# Patient Record
Sex: FEMALE | Race: BLACK OR AFRICAN AMERICAN | Employment: UNEMPLOYED | URBAN - METROPOLITAN AREA
[De-identification: names, ages, dates, MRNs, and addresses within clinical notes are randomized per-mention and may not be internally consistent; named-entity substitution may affect disease eponyms.]

---

## 2021-08-07 NOTE — BH NOTES
TRANSFER - IN REPORT:    Verbal report received from RN(name) on Noah Torres  being received from [facility] ACUTE PSYCH UN[facility]) for routine progression of care      Report consisted of patients Situation, Background, Assessment and   Recommendations(SBAR). Information from the following report(s) SBAR was reviewed with the receiving nurse. Opportunity for questions and clarification was provided. Assessment completed upon patients arrival to unit and care assumed.

## 2021-08-08 ENCOUNTER — HOSPITAL ENCOUNTER (INPATIENT)
Age: 36
LOS: 2 days | Discharge: HOME | DRG: 885 | End: 2021-08-10
Attending: PSYCHIATRY & NEUROLOGY | Admitting: PSYCHIATRY & NEUROLOGY
Payer: MEDICARE

## 2021-08-08 PROCEDURE — 65220000003 HC RM SEMIPRIVATE PSYCH

## 2021-08-08 PROCEDURE — 74011250637 HC RX REV CODE- 250/637: Performed by: NURSE PRACTITIONER

## 2021-08-08 RX ORDER — DIPHENHYDRAMINE HYDROCHLORIDE 50 MG/ML
50 INJECTION, SOLUTION INTRAMUSCULAR; INTRAVENOUS
Status: DISCONTINUED | OUTPATIENT
Start: 2021-08-08 | End: 2021-08-10 | Stop reason: HOSPADM

## 2021-08-08 RX ORDER — HYDROXYZINE 50 MG/1
50 TABLET, FILM COATED ORAL
Status: DISCONTINUED | OUTPATIENT
Start: 2021-08-08 | End: 2021-08-10 | Stop reason: HOSPADM

## 2021-08-08 RX ORDER — MIRTAZAPINE 15 MG/1
15 TABLET, FILM COATED ORAL
Status: ON HOLD | COMMUNITY
End: 2021-08-09

## 2021-08-08 RX ORDER — ACETAMINOPHEN 325 MG/1
650 TABLET ORAL
Status: DISCONTINUED | OUTPATIENT
Start: 2021-08-08 | End: 2021-08-10 | Stop reason: HOSPADM

## 2021-08-08 RX ORDER — OLANZAPINE 5 MG/1
5 TABLET ORAL
Status: DISCONTINUED | OUTPATIENT
Start: 2021-08-08 | End: 2021-08-10 | Stop reason: HOSPADM

## 2021-08-08 RX ORDER — ADHESIVE BANDAGE
30 BANDAGE TOPICAL DAILY PRN
Status: DISCONTINUED | OUTPATIENT
Start: 2021-08-08 | End: 2021-08-10 | Stop reason: HOSPADM

## 2021-08-08 RX ORDER — TRAZODONE HYDROCHLORIDE 50 MG/1
50 TABLET ORAL
Status: DISCONTINUED | OUTPATIENT
Start: 2021-08-08 | End: 2021-08-10 | Stop reason: HOSPADM

## 2021-08-08 RX ORDER — SERTRALINE HYDROCHLORIDE 50 MG/1
50 TABLET, FILM COATED ORAL DAILY
Status: ON HOLD | COMMUNITY
End: 2021-08-09

## 2021-08-08 RX ORDER — BUPROPION HYDROCHLORIDE 150 MG/1
150 TABLET ORAL DAILY
Status: ON HOLD | COMMUNITY
End: 2021-08-09

## 2021-08-08 RX ORDER — HALOPERIDOL 5 MG/ML
5 INJECTION INTRAMUSCULAR
Status: DISCONTINUED | OUTPATIENT
Start: 2021-08-08 | End: 2021-08-10 | Stop reason: HOSPADM

## 2021-08-08 RX ORDER — BENZTROPINE MESYLATE 1 MG/1
1 TABLET ORAL
Status: DISCONTINUED | OUTPATIENT
Start: 2021-08-08 | End: 2021-08-10 | Stop reason: HOSPADM

## 2021-08-08 RX ORDER — IBUPROFEN 200 MG
1 TABLET ORAL DAILY
Status: DISCONTINUED | OUTPATIENT
Start: 2021-08-09 | End: 2021-08-10 | Stop reason: HOSPADM

## 2021-08-08 RX ORDER — LORAZEPAM 2 MG/ML
1 INJECTION INTRAMUSCULAR
Status: DISCONTINUED | OUTPATIENT
Start: 2021-08-08 | End: 2021-08-10 | Stop reason: HOSPADM

## 2021-08-08 RX ADMIN — HYDROXYZINE HYDROCHLORIDE 50 MG: 50 TABLET, FILM COATED ORAL at 17:11
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
## 2021-08-08 NOTE — PROGRESS NOTES
Problem: Chemical Dependency (Adult/Pediatric)  Goal: *STG: Participates in treatment plan  Outcome: Progressing Towards Goal  Goal: *STG: Remains safe in hospital  Outcome: Progressing Towards Goal  Goal: *STG: Seeks staff when symptoms of withdrawal increase  Outcome: Progressing Towards Goal    Patient compliant with directives. Oriented to unit.

## 2021-08-09 PROCEDURE — 65220000003 HC RM SEMIPRIVATE PSYCH

## 2021-08-09 PROCEDURE — 74011250637 HC RX REV CODE- 250/637: Performed by: PSYCHIATRY & NEUROLOGY

## 2021-08-09 PROCEDURE — 74011250637 HC RX REV CODE- 250/637: Performed by: NURSE PRACTITIONER

## 2021-08-09 RX ORDER — MIRTAZAPINE 15 MG/1
15 TABLET, FILM COATED ORAL
Status: DISCONTINUED | OUTPATIENT
Start: 2021-08-09 | End: 2021-08-10 | Stop reason: HOSPADM

## 2021-08-09 RX ORDER — SERTRALINE HYDROCHLORIDE 50 MG/1
50 TABLET, FILM COATED ORAL DAILY
Status: DISCONTINUED | OUTPATIENT
Start: 2021-08-10 | End: 2021-08-10 | Stop reason: HOSPADM

## 2021-08-09 RX ORDER — LAMOTRIGINE 25 MG/1
25 TABLET ORAL DAILY
Status: DISCONTINUED | OUTPATIENT
Start: 2021-08-10 | End: 2021-08-10 | Stop reason: HOSPADM

## 2021-08-09 RX ADMIN — MIRTAZAPINE 15 MG: 15 TABLET, FILM COATED ORAL at 20:50

## 2021-08-09 NOTE — PROGRESS NOTES
2300: Resting quietly in bed with eyes closed. No apparent distress. Respirations are even and unlabored. Staff will continue to monitor q15 throughout the shift. Problem: Falls - Risk of  Goal: *Absence of Falls  Description: Document Renay Barba Fall Risk and appropriate interventions in the flowsheet.   Outcome: Progressing Towards Goal  Note: Fall Risk Interventions:            Medication Interventions: Teach patient to arise slowly

## 2021-08-09 NOTE — BH NOTES
GROUP THERAPY PROGRESS NOTE    Patient is participating in community meeting/discharge and goals group. Group time: 50 minutes    Personal goal for participation: Set goals for treatment and mental health recovery as a strategy to increase motivation around action steps to achieving goals and start to identify some objectives for future individual or group therapy in the outpatient setting. Goal orientation: Personal    Group therapy participation:  passive     Therapeutic interventions reviewed and discussed: Group members were engaged in conversation about treatment team, effective communication and goal setting. Group members were given the opportunity to reflect on the most important areas of their lives (family, friends, work/school, spirituality, physical health, and mental health), what they are happy with and feel is going well, as well as what they would like to improve on and how that would change their life. They were encouraged to set goals (for the day, the week and their discharge/treatment plan) and discuss the actions steps necessary to achieve their goals. Impression of participation: She was alert, oriented and calm. She demonstrated active listening at times but also appeared agitated and disengaged with her Tx in general. She remained in group but did not participate in conversation.        TAMMY Foy, Supervisee in Social Work

## 2021-08-09 NOTE — PROGRESS NOTES
Admission Medication Reconciliation:    Information obtained from:  patient interview, communication with [address] (phone number: [phone]), and [facility]  RxQuery data available¹:  NO    Comments/Recommendations: Updated PTA meds/reviewed patient's allergies. 1)  Patient reports that she was not taking any medications prior to admission. She most recently was prescribed bupropion XL 150mg, sertraline 50mg daily, and mirtazapine 15mg QHS. RN called [address] and they confirmed that the patient had been prescribed those medications last in 2/2021. Per the [facility], she was also prescribed naltrexone 50mg daily in 2/2021. History of previous hospitalization 8/12/12-8/17/12 [facility]). Discharged on: escitalopram 20 mg QAM + risperidone 2 mg QHS + VPA  mg BID + lithium 300 mg TID. Also was prescribed lamotrigine and olanzapine in 2013. 2)  The [place] Prescription Monitoring Program () was assessed to determine fill history of any controlled medications. The patient has NOT filled any controlled medications in the last  2 years. 3)  Medication changes (since last review):  Removed  - bupropion, cyclobenzaprine, escitalopram, estradiol, lamotrigine, lithium, medroxyprogesterone, mirtazapine, olanzapine, sertraline, and trazodone. ¹RxQuery pharmacy benefit data reflects medications filled and processed through the patient's insurance, however this data does NOT capture whether the medication was picked up or is currently being taken by the patient. Allergies:  Patient has no known allergies. Significant PMH/Disease States:   Past Medical History:   Diagnosis Date    Asthma     as a child - not currently on medication. Psychosis 4/1/2013    Supervision of other normal pregnancy 5/5/2011     Chief Complaint for this Admission:  No chief complaint on file.     Prior to Admission Medications:   None     Morales Sanz

## 2021-08-09 NOTE — PROGRESS NOTES
NUTRITION     Nutrition screening referral was triggered based on results obtained during nursing admission assessment for Unsure wt loss    The patient's chart was reviewed and nutrition assessment is not indicated at this time. No significant wt changes noted. Wt Readings:   08/08/21 66.3 kg (146 lb 1.6 oz)   05/14/14 59.6 kg (131 lb 6.4 oz)   12/05/13 64.1 kg (141 lb 6.4 oz)     Plan to see patient for rescreen as indicated. Thank you.      Dann Mejias RD
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
## 2021-08-09 NOTE — PROGRESS NOTES
Problem: Chemical Dependency (Adult/Pediatric)  Goal: *STG: Participates in treatment plan  Outcome: Progressing Towards Goal     Problem: Falls - Risk of  Goal: *Absence of Falls  Description: Document Luis Fall Risk and appropriate interventions in the flowsheet. Outcome: Progressing Towards Goal  Note: Fall Risk Interventions:            Medication Interventions: Teach patient to arise slowly       Pt received calm and cooperative, no distress noted, pt meal and medication compliant, will continue to monitor patient q15 mins for safety.

## 2021-08-09 NOTE — BH NOTES
GROUP THERAPY PROGRESS NOTE     Patient did not participate in Psychotherapy Group.      TAMMY Wilde, Supervisee in Social Work

## 2021-08-09 NOTE — INTERDISCIPLINARY ROUNDS
Behavioral Health Interdisciplinary Rounds     Patient Name: Delroy Humphrey  Age: 37 y.o. Room/Bed:  729/  Primary Diagnosis: <principal problem not specified>   Admission Status: Voluntary     Readmission within 30 days: no  Power of  in place: no  Patient requires a blocked bed: no          Reason for blocked bed:     VTE Prophylaxis: No    Mobility needs/Fall risk: no  Flu Vaccine : no   Nutritional Plan: no  Consults:          Labs/Testing due today?: no    Sleep hours:  7        Participation in Care/Groups:  New Admission  Medication Compliant?:   PRNS (last 24 hours): Antianxiety    Restraints (last 24 hours):  no     CIWA (range last 24 hours):     COWS (range last 24 hours):      Alcohol screening (AUDIT) completed -   AUDIT Score: 8     If applicable, date SBIRT discussed in treatment team AND documented:   AUDIT Screen Score: AUDIT Score: 8      Document Brief Intervention (corresponds directly with the 5 A's, Ask, Advise, Assess, Assist, and Arrange): At- Risk Patients (Score 7-15 for women; 8-15 for men)  Discuss concern patient is drinking at unhealthy levels known to increase risk of alcohol-related health problems. Is Patient ready to commit to change? no    If No:   Encourage reflection   Discuss short term and long term health risks of consuming alcohol- stop drinking    Barriers to change-    Reaffirm willingness to help / Educational materials provided    Harmful use or Dependence (Score 16 or greater)   Discuss short term and long term health risks of consuming alcohol   Recommendations   Negotiate drinking goal   Recommend addiction specialist/center   Arrange follow-up appointments.     Tobacco - patient is a smoker: Have You Used Tobacco in the Past 30 Days: Yes  Illegal Drugs use: Have You Used Any Illegal Substances Over the Past 12 Months: Yes    24 hour chart check complete: yes     Patient goal(s) for today:   Treatment team focus/goals: Plan to assess for medications and discharge needs. Progress note : she was tearful in treatment team, denies SI, States she wants outpatient through [facility]   LOS:  1  Expected LOS: TBD     Financial concerns/prescription coverage:  Medicaid   Family contact:  Mariama Yuen- [phone]     Family requesting physician contact today:    Discharge plan: He will be staying with family when ready for discharge   Access to weapons :  No        Outpatient provider(s): [facility]   Patient's preferred phone number for follow up call :   Patient's preferred e-mail address :  Participating treatment team members: Ernie Mejia Dr. ,RN - Cat Torres, PharmD.

## 2021-08-09 NOTE — H&P
[facility] Adult  Hospitalist Group  History and Physical    Primary Care Provider: Charbel Borges MD  Date of Service:  8/9/2021    Subjective:     Ana Larose is a 37 y.o. female with a past medical history of asthma and adhd who presented to [facility] Emergency Department with complaints of SI with a place to overdose. Has had 3 past suicide attempts with the last time being one year ago. Recently admitted to [facility] 06/21. She was transferred to [facility] for further treatment. Hospitalist was consulted for H&P and medical management. Upon examination, she is AAOx3 cooperative throughout conversation. States that she does not take any medications at home. Recently feeling constipated, relates that to drug use. Reports daily use of marijuana, cocaine and heroin. States that she has had an ongoing drug addiction for many years on and off. 3-4 months ago, started using again everyday. Denies any dizziness, syncope, shortness of breath, chest pain or tightness, nausea, vomiting or diarrhea. Denies urinary complaints. Labs from outside hospital reviewed. Review of Systems:    A comprehensive review of systems was negative except for that written in the History of Present Illness. Past Medical History:   Diagnosis Date    Asthma     as a child - not currently on medication.  Psychosis 4/1/2013    Supervision of other normal pregnancy 5/5/2011      No past surgical history on file. Prior to Admission medications    Medication Sig Start Date End Date Taking? Authorizing Provider   sertraline (Zoloft) 50 mg tablet Take 50 mg by mouth daily. Indications: bipolar depression    Provider, Historical   buPROPion XL (Wellbutrin XL) 150 mg tablet Take 150 mg by mouth daily. Indications: bipolar depression    Provider, Historical   mirtazapine (Remeron) 15 mg tablet Take 15 mg by mouth nightly.     Provider, Historical   medroxyPROGESTERone (DEPO-PROVERA) 150 mg/mL syrg 1 mL by IntraMUSCular route every three (3) months. Patient not taking: Reported on 8/8/2021 5/12/14   Mary Anne Polo MD   estradiol (ESTRACE) 0.5 mg tablet Take 1 Tab by mouth daily. Patient not taking: Reported on 8/8/2021 11/1/13   Claribel Whatley MD   escitalopram (LEXAPRO) 10 mg tablet Take 10 mg by mouth daily. Patient not taking: Reported on 8/8/2021    Provider, Historical   cyclobenzaprine (FLEXERIL) 10 mg tablet Take  by mouth every eight (8) hours as needed. Patient not taking: Reported on 8/8/2021    Provider, Historical   lithium SR (LITHOBID) 300 mg CR tablet Take 300 mg by mouth two (2) times a day. Patient not taking: Reported on 8/8/2021    Provider, Historical   OLANZapine (ZYPREXA) 10 mg tablet Take 10 mg by mouth nightly. Patient not taking: Reported on 8/8/2021    Provider, Historical   lamoTRIgine (LAMICTAL) 200 mg tablet Take  by mouth daily. Patient not taking: Reported on 8/8/2021    Provider, Historical   traZODone (DESYREL) 100 mg tablet Take 100 mg by mouth nightly. Patient not taking: Reported on 8/8/2021    Provider, Historical     No Known Allergies   Family History   Problem Relation Age of Onset    Cancer Mother         Breast cancer    Cancer Maternal Uncle         Lung cancer        SOCIAL HISTORY:  Patient resides at Home. Patient ambulates with Independent . Smoking history: 1ppd   Alcohol history: Denies   Drug history: Daily use of THC, cocaine and heroin. Objective:       Physical Exam:   Visit Vitals  /69 (BP 1 Location: Right arm, BP Patient Position: Sitting)   Pulse 90   Temp 98.4 °F (36.9 °C)   Resp 16   Ht 5' 5\" (1.651 m)   Wt 66.3 kg (146 lb 1.6 oz)   SpO2 97%   Breastfeeding No   BMI 24.31 kg/m²     General appearance: alert, cooperative, no distress, appears stated age  Lungs: clear to auscultation bilaterally  Heart: regular rate and rhythm, S1, S2 normal, no murmur, click, rub or gallop  Abdomen: soft, non-tender. Bowel sounds normal. No masses,  no organomegaly  Extremities: extremities normal, atraumatic, no cyanosis or edema  Pulses: 2+ and symmetric  Skin: Skin color, texture, turgor normal. No rashes or lesions  Neurologic: Grossly normal  Cap refill: Brisk, less than 3 seconds  Pulses: 2+, symmetric in all extremities        Data Review: All diagnostic labs and studies have been reviewed. Assessment:     Active Problems:    Bipolar disorder ([facility] (12/2/2011)        Plan:     Hx of asthma  - Stable     Hx of ADHD  - Not on medications     Tobacco abuse   -Smoking cessation counseling provided  Nicotine patch    Polysubstance abuse  Management per primary team    Bipolar disorder/SI  Management per primary team    Thank you for allowing us participation in patient's care. If you have any questions or need further assistance please not hesitate to contact us again. We will sign off now. FUNCTIONAL STATUS PRIOR TO HOSPITALIZATION (including history of recent falls): Independent     Signed By: Nicholas Hall NP     August 9, 2021

## 2021-08-09 NOTE — BH NOTES
GROUP THERAPY PROGRESS NOTE    Patient is participating in Psychosocial Assessment Group. Group time: 60 minutes    Personal goal for participation: To complete a psychosocial assessment     Goal orientation: Personal    Group therapy participation: active     Therapeutic interventions reviewed and discussed: Social workers completed psychosocial assessment on patients upon admission to the unit. The goal is to gather vital information about a patient in order to best meet their needs during their stay here. Impression of participation: Participated in group.      Philomena Velez, Supervisee in Social Work

## 2021-08-09 NOTE — BH NOTES
GROUP THERAPY PROGRESS NOTE    Patient is participating in Treatment Team Group. Group time: 60 minutes    Personal goal for participation: To participate in treatment plan and discharge     Goal orientation: Personal    Group therapy participation: active     Therapeutic interventions reviewed and discussed: Group members met with their treatment team individually and discussed their treatment plan with their provider, , nurse, and pharmacist. Each patient was given the opportunity to ask questions related to their discharge and/or medications. Impression of participation: Patient participated in treatment team. Engaged in conversation and discussion. Asked questions about treatment plan and discharge coordination.      Philomena Velez, Supervisee in Social Work

## 2021-08-09 NOTE — H&P
[facility] HISTORY AND PHYSICAL    Name:  James Avila  MR#:  [id number]  :  1985  ACCOUNT #:  [de-identified]  ADMIT DATE:  2021      INITIAL PSYCHIATRIC INTERVIEW    CHIEF COMPLAINT:  \"I don't feel so good today. \"    HISTORY OF PRESENT ILLNESS:  The patient is a 42-year-old [facility] American female who is currently admitted to us after she was transferred from a hospital in [place]. She reports that she drove to the hospital because she was having thoughts about suicide and feels that her whole life is falling apart. Reports that her depression has been progressively worsening for several months and she has had multiple stressors recently. This includes losing custody of her 6year-old daughter and also states that she is unable to keep a job. States that she had been in rehab at [facility] in [facility] for about 5 months, but relapsed as soon as she left in 2021 and has been using since then. She estimates that she uses about 200 dollars worth of cocaine almost everyday and has been using since the age of 15 years. States that she also started using about 40 dollars worth of heroin which she uses IV or snorts and she has been using this for several months now. Also reports that she occasionally smokes marijuana. Notes that she had been sleeping poorly, has had little energy or motivation and was having thoughts of suicide and wanted to end her life by taking an overdose of heroin. Denies any perceptual abnormalities. Reports that she has been partially compliant with taking her medications, although when she was taking them, she reports feeling better. She has been calm and cooperative since her arrival on the unit. Cried briefly during the interview but was able to spontaneously recompose herself.     PAST MEDICAL HISTORY:  Reviewed as per the history and physical exam.      Past Medical History:   Diagnosis Date    Asthma     as a child - not currently on medication.  Psychosis 4/1/2013    Supervision of other normal pregnancy 5/5/2011     Prior to Admission medications    Not on File     Vitals:    08/09/21 0757 08/09/21 1147 08/09/21 1647 08/09/21 2001   BP: 102/74 104/69 114/70 103/72   Pulse: 68 90 90 98   Resp: 16 16 16 18   Temp: 98.4 °F (36.9 °C) 98.4 °F (36.9 °C) 98.1 °F (36.7 °C) 98.6 °F (37 °C)   SpO2: 96% 97% 98% 97%   Weight:       Height:         Lab Results   Component Value Date/Time    WBC 8.1 10/15/2013 12:35 PM    Hemoglobin (POC) 14.5 11/23/2011 10:06 AM    HGB 14.1 10/15/2013 12:35 PM    HCT 45.1 10/15/2013 12:35 PM    PLATELET 440 (H) 68/70/9073 12:35 PM    MCV 86 10/15/2013 12:35 PM    Hgb, External 11.7 02/03/2011 02:37 PM    Hct, External 36.2 02/03/2011 02:37 PM    Platelet cnt., External 366 02/03/2011 02:37 PM     Lab Results   Component Value Date/Time    Sodium 141 10/15/2013 12:35 PM    Potassium 4.2 10/15/2013 12:35 PM    Chloride 105 10/15/2013 12:35 PM    CO2 16 (L) 10/15/2013 12:35 PM    Anion gap 5 08/14/2012 05:50 AM    Glucose 77 10/15/2013 12:35 PM    BUN 11 10/15/2013 12:35 PM    Creatinine 0.76 10/15/2013 12:35 PM    BUN/Creatinine ratio 14 10/15/2013 12:35 PM    GFR est  10/15/2013 12:35 PM    GFR est non- 10/15/2013 12:35 PM    Calcium 10.0 10/15/2013 12:35 PM    Bilirubin, total 0.6 10/15/2013 12:35 PM    Alk. phosphatase 86 10/15/2013 12:35 PM    Protein, total 7.6 10/15/2013 12:35 PM    Albumin 4.6 10/15/2013 12:35 PM    Globulin 3.4 08/14/2012 05:50 AM    A-G Ratio 1.5 10/15/2013 12:35 PM    ALT (SGPT) 17 10/15/2013 12:35 PM    AST (SGOT) 22 10/15/2013 12:35 PM     Lab Results   Component Value Date/Time    Valproic acid 64 08/17/2012 06:00 AM     Lab Results   Component Value Date/Time    Lithium level 0.50 (L) 08/17/2012 06:00 AM     RADIOLOGY REPORTS:(reviewed/updated 8/10/2021)  No results found.   Lab Results   Component Value Date/Time    HCG urine, Ql. (POC) Negative 08/30/2013 12:00 PM PAST PSYCHIATRIC HISTORY:  The patient reports that she was first hospitalized at the age of 14 years and estimates that she has had 5 or 10 prior psychiatric hospitalizations. Her last one was in a hospital in [facility] in 06/2021. She states that she was diagnosed with bipolar disorder about 8 years ago and has been intermittently on treatment since then. As noted above, she started using cocaine at the age of 15 years and has been in rehab without much success. PSYCHOSOCIAL HISTORY:  The patient currently lives by herself in [place]. She is single, has never been  but does have 2 children including a 3year-old and an 6year-old, both of whom are in custody of family members. She is currently on social security disability for her bipolar disorder diagnosis and denies any major legal stressors. MENTAL STATUS EXAM:  The patient is a young [place] American female who is dressed in casual street clothes. She looks depressed and cried during the interview. Makes limited eye contact. Speech is spontaneous and coherent. Mood is reported as being down. Affect is depressed. Passive thoughts of suicide are present but feels safe in the hospital.  Denies any perceptual abnormalities. Denies any delusions. Her thought process is logical and goal-directed. Cognitively, she is awake and alert, oriented to time, place and person. Intelligence is average. Memory is intact and fund of knowledge is adequate. Insight is partial.  Judgment is poor. ASSESSMENT AND PLAN/DIAGNOSES:  Bipolar I disorder, most recent episode depression without psychotic symptoms, cocaine use disorder, severe, heroin use disorder, severe. At the present time, I will continue her inpatient stay. She will be provided with support and attend groups. Estimated length of stay is 5-7 days. Her strengths include her ability to seek help and support from her family.         KAROLINE WILSON, MD [email or web address]  D:  08/09/2021 13:53  T:  08/09/2021 14:50  JOB #:  [id number]

## 2021-08-09 NOTE — PROGRESS NOTES
Problem: Chemical Dependency (Adult/Pediatric)  Goal: *STG: Participates in treatment plan  Outcome: Progressing Towards Goal  Pt attends AA meeting. Pt verbalizes her needs appropriately. Vistaril 50 mg administered for c/o anxiety at 1712  Pt reports positive results.

## 2021-08-09 NOTE — PROGRESS NOTES
Problem: Chemical Dependency (Adult/Pediatric)  Goal: *STG: Participates in treatment plan  Outcome: Progressing Towards Goal  Note: Out on unit passively engaged mood and afffect sad, guilt/shame and disappointment in self related to polysubstance. Denies SI, future focused and admission goal is sobriety and get engaged with outpatient rehab.  Staff focus is on offering education on medications   Goal: *STG: Seeks staff when symptoms of withdrawal increase  Outcome: Progressing Towards Goal  Goal: *STG: Attends activities and groups  Outcome: Progressing Towards Goal  Goal: *STG: Will identify negative impact of chemical dependency including the use of tobacco, alcohol, and other substances  Outcome: Progressing Towards Goal  Goal: *STG: Agrees to participate in outpatient after care program to support ongoing mental health  Outcome: Progressing Towards Goal  Goal: Interventions  Outcome: Sera Cooley  Master Treatment Plan for Markell Vyas    Date Treatment Plan Initiated: 8/9/21    Treatment Plan Modalities:  Type of Modality Amount  (x minutes) Frequency (x/week) Duration (x days) Name of Responsible Staff   [address] [facility] meetings to encourage peer interactions 15 7 1 Tomi Valerio RN     Group psychotherapy to assist in building coping skills and internal controls 60 7 1 Philomena Velez   Therapeutic activity groups to build coping skills 60 7 1 Philomena Velez   Psychoeducation in group setting to address:   Medication education   15 [phone] [address] 149 skills   30 3 1 Philomena Velez   Relaxation techniques         Symptom management         Discharge planning   60 2 255 [facility]    60 2 1 Chaplain GOLDSTEIN   60 1 1 volunteer   Recovery/AA/NA      volunteer   Physician medication management   15 7 1 Dr. Palma Frances   15 2 1 Noble Plasticso and Jennifer KiranWork 'n Gear

## 2021-08-09 NOTE — BH NOTES
GROUP THERAPY PROGRESS NOTE    Patient is participating in Leisure Education Group. Group time: 45 minutes    Personal goal for participation: To boost self-esteem and confidence     Goal orientation: Personal    Group therapy participation: active    Therapeutic interventions reviewed and discussed: Everyone in group is to complete the activity Toot your own horn by filling in and completing various sentences about themselves in a positive way. Group discussion around challenges completing this activity and different thoughts that patients pondered upon while doing so. Each member shared their activity, if they wanted to, and discussed things they learned about themselves or where there is room for growth. Impression of participation: Ghassan Strong actively participated in group. Patient engaged in conversation and discussion. She completed the worksheet and shared it in group. Pt shared she set a goal to go back to school and get her degree in Music. Pleasant and cooperative.      TAMMY Wilde, Supervisee in Social Work

## 2021-08-09 NOTE — PROGRESS NOTES
Problem: Discharge Planning  Goal: *Discharge to safe environment  Outcome: Progressing Towards Goal  Note: She reports she has safe housing   She has supportive family   Goal: *Knowledge of medication management  Outcome: Progressing Towards Goal  Note: She is willing to take medications   Goal: *Knowledge of discharge instructions  Outcome: Progressing Towards Goal  Note: She is able to verbalize discharge instructions

## 2021-08-10 VITALS
WEIGHT: 146.1 LBS | SYSTOLIC BLOOD PRESSURE: 130 MMHG | HEIGHT: 65 IN | HEART RATE: 71 BPM | BODY MASS INDEX: 24.34 KG/M2 | OXYGEN SATURATION: 98 % | RESPIRATION RATE: 16 BRPM | TEMPERATURE: 99 F | DIASTOLIC BLOOD PRESSURE: 78 MMHG

## 2021-08-10 PROCEDURE — 74011250637 HC RX REV CODE- 250/637: Performed by: PSYCHIATRY & NEUROLOGY

## 2021-08-10 PROCEDURE — 74011250637 HC RX REV CODE- 250/637: Performed by: NURSE PRACTITIONER

## 2021-08-10 RX ORDER — SERTRALINE HYDROCHLORIDE 50 MG/1
50 TABLET, FILM COATED ORAL DAILY
Qty: 30 TABLET | Refills: 0 | Status: SHIPPED | OUTPATIENT
Start: 2021-08-10

## 2021-08-10 RX ORDER — HYDROXYZINE 50 MG/1
50 TABLET, FILM COATED ORAL
Qty: 60 TABLET | Refills: 0 | Status: SHIPPED | OUTPATIENT
Start: 2021-08-10 | End: 2021-09-09

## 2021-08-10 RX ORDER — LAMOTRIGINE 25 MG/1
TABLET ORAL
Qty: 59 TABLET | Refills: 0 | Status: SHIPPED | OUTPATIENT
Start: 2021-08-10

## 2021-08-10 RX ORDER — MIRTAZAPINE 15 MG/1
15 TABLET, FILM COATED ORAL
Qty: 30 TABLET | Refills: 0 | Status: SHIPPED | OUTPATIENT
Start: 2021-08-10

## 2021-08-10 RX ADMIN — TRAZODONE HYDROCHLORIDE 50 MG: 50 TABLET ORAL at 00:20

## 2021-08-10 RX ADMIN — SERTRALINE 50 MG: 50 TABLET, FILM COATED ORAL at 08:03

## 2021-08-10 RX ADMIN — LAMOTRIGINE 25 MG: 25 TABLET ORAL at 08:03

## 2021-08-10 NOTE — BH NOTES
GROUP THERAPY PROGRESS NOTE    Patient is participating in psychotherapy group. Group time: 45 minutes    Personal goal for participation: identify signs and symptoms of stress and appropriate coping skills to use to minimize stress effect     Goal orientation: Personal    Group therapy participation: active    Therapeutic interventions reviewed and discussed:  Patient was given opportunity to engaged in conversation about crisis and safety planning and to create a personalized safety plan. Patient was guided through assessing triggers and warning signs (specific thoughts, feelings, behaviors, body sensations, etc.) that a crisis may be developing, using their current hospitalization and recent crisis event as the case study. Pt was then supported to generate a list of internal coping strategies they can do independently to redirect their thinking and deescalate impending crisis. Patient was encouraged to assess their support system and list specific people (family, friends, mental health professionals, etc.)  that can be mobilized to provide a distraction as well as needed help. Impression of participation: She was alert, oriented and focused. She was eager for discharge and willing to engaged in safety planning. She discussed her warning signs, and reviewed coping skills and support systems. She asked questions about OP follow up and how to obtain medications, demonstrating a desire to continue treatment and avoid rehospitalization.        TAMMY Gentile, Supervisee in Social Work

## 2021-08-10 NOTE — PROGRESS NOTES
Problem: Falls - Risk of  Goal: *Absence of Falls  Description: Document Gabriela Boateng Fall Risk and appropriate interventions in the flowsheet. Outcome: Progressing Towards Goal  Note: Fall Risk Interventions:        Medication Interventions: Teach patient to arise slowly      Patient received resting quietly in bed. No signs of distress. Even and unlabored breathing. Staff will continue to monitor safety q15 and provide support.

## 2021-08-10 NOTE — BH NOTES
GROUP THERAPY PROGRESS NOTE     Patient participated in recreational therapy group. Group time: 45 min     Personal goal for participation: Practicing deep breathing and progressive muscle relaxation (PMR) as an enjoyable and therapeutic stress reduction strategy. Goal orientation: Personal      Group therapy participation: active      Therapeutic interventions reviewed and discussed: Group members were engaged in conversation about coping skills and provided psychoeducation about the benefits of PMR. Members were then led through breathing and PMR exercises. Afterwards group members process their experience, discussing how they felt during and after practicing coping skills, where they noticed tension in their bodies and how they can use these coping skills when they leave the hospital.      Impression of participation: She was alert, oriented and engaged. She was observed participating in group activity but did not share her opinions or experience during group discussion.      TAMMY Silva, Supervisee in Social Work

## 2021-08-10 NOTE — BH NOTES
GROUP THERAPY PROGRESS NOTE    Patient is participating in Treatment Team Group. Group time: 10:00 to 11:00     Personal goal for participation: To participate in treatment plan and discharge     Goal orientation: Personal    Group therapy participation: active     Therapeutic interventions reviewed and discussed: Group members met with their treatment team individually and discussed their treatment plan with their provider, , nurse, and pharmacist. Each patient was given the opportunity to ask questions related to their discharge and/or medications. Impression of participation: Patient participated in treatment team. Engaged in conversation and discussion. Asked questions about treatment plan and discharge.

## 2021-08-10 NOTE — INTERDISCIPLINARY ROUNDS
Behavioral Health Interdisciplinary Rounds     Patient Name: Simon Chavez  Age: 37 y.o.   Room/Bed:  729/  Primary Diagnosis: <principal problem not specified>   Admission Status: Voluntary     Readmission within 30 days: no  Power of  in place: no  Patient requires a blocked bed: no          Reason for blocked bed:     VTE Prophylaxis: No    Mobility needs/Fall risk: no  Flu Vaccine : no   Nutritional Plan: no  Consults:          Labs/Testing due today?: no    Sleep hours:  8+      Participation in Care/Groups:  yes  Medication Compliant?: Yes  PRNS (last 24 hours): Sleep Aid    Restraints (last 24 hours):  no     CIWA (range last 24 hours):     COWS (range last 24 hours):      Alcohol screening (AUDIT) completed -   AUDIT Score: 8     If applicable, date SBIRT discussed in treatment team AND documented:   AUDIT Screen Score: AUDIT Score: 8      Tobacco - patient is a smoker: Have You Used Tobacco in the Past 30 Days: Yes  Illegal Drugs use: Have You Used Any Illegal Substances Over the Past 12 Months: Yes    24 hour chart check complete: yes     Patient goal(s) for today:   Treatment team focus/goals: Plan for discharge today   Progress note : She denies SI , She has been pleasant and compliant with her treatment     LOS:  2  Expected LOS: today     Financial concerns/prescription coverage: medicaid   Family contact:       Family requesting physician contact today:    Discharge plan: she will go to family's house in [address] [place] [address] [place] to weapons :  No      Outpatient provider(s): [address] [place] [address]   Patient's preferred phone number for follow up call :   Patient's preferred e-mail address :  Participating treatment team members: Sharmilame Kathy, [address] [place] [address]

## 2021-08-10 NOTE — DISCHARGE SUMMARY
DISCHARGE SUMMARY    Some parts of the discharge summary are from the initial Psychiatric interview that was done on admission by the admitting psychiatrist.     Date of Admission: 8/8/2021    Date of Discharge: 8/10/2021     TYPE OF DISCHARGE:   REGULAR -  YES    AMA  RELEASED BY THE TDO COURT    CHIEF COMPLAINT:  \"I don't feel so good today. \"     HISTORY OF PRESENT ILLNESS:  The patient is a 21-year-old [facility] American female who is currently admitted to us after she was transferred from a hospital in [place]. She reports that she drove to the hospital because she was having thoughts about suicide and feels that her whole life is falling apart. Reports that her depression has been progressively worsening for several months and she has had multiple stressors recently. This includes losing custody of her 6year-old daughter and also states that she is unable to keep a job. States that she had been in rehab at [facility] in [facility] for about 5 months, but relapsed as soon as she left in 03/2021 and has been using since then. She estimates that she uses about 200 dollars worth of cocaine almost everyday and has been using since the age of 15 years. States that she also started using about 40 dollars worth of heroin which she uses IV or snorts and she has been using this for several months now. Also reports that she occasionally smokes marijuana. Notes that she had been sleeping poorly, has had little energy or motivation and was having thoughts of suicide and wanted to end her life by taking an overdose of heroin. Denies any perceptual abnormalities. Reports that she has been partially compliant with taking her medications, although when she was taking them, she reports feeling better. She has been calm and cooperative since her arrival on the unit.   Cried briefly during the interview but was able to spontaneously recompose herself.     PAST MEDICAL HISTORY:  Reviewed as per the history and physical exam.             Past Medical History:   Diagnosis Date    Asthma       as a child - not currently on medication.  Psychosis 4/1/2013    Supervision of other normal pregnancy 5/5/2011      Prior to Admission medications    Not on File             Vitals:     08/09/21 0757 08/09/21 1147 08/09/21 1647 08/09/21 2001   BP: 102/74 104/69 114/70 103/72   Pulse: 68 90 90 98   Resp: 16 16 16 18   Temp: 98.4 °F (36.9 °C) 98.4 °F (36.9 °C) 98.1 °F (36.7 °C) 98.6 °F (37 °C)   SpO2: 96% 97% 98% 97%   Weight:           Height:                    Lab Results   Component Value Date/Time     WBC 8.1 10/15/2013 12:35 PM     Hemoglobin (POC) 14.5 11/23/2011 10:06 AM     HGB 14.1 10/15/2013 12:35 PM     HCT 45.1 10/15/2013 12:35 PM     PLATELET 045 (H) 53/10/5937 12:35 PM     MCV 86 10/15/2013 12:35 PM     Hgb, External 11.7 02/03/2011 02:37 PM     Hct, External 36.2 02/03/2011 02:37 PM     Platelet cnt., External 366 02/03/2011 02:37 PM            Lab Results   Component Value Date/Time     Sodium 141 10/15/2013 12:35 PM     Potassium 4.2 10/15/2013 12:35 PM     Chloride 105 10/15/2013 12:35 PM     CO2 16 (L) 10/15/2013 12:35 PM     Anion gap 5 08/14/2012 05:50 AM     Glucose 77 10/15/2013 12:35 PM     BUN 11 10/15/2013 12:35 PM     Creatinine 0.76 10/15/2013 12:35 PM     BUN/Creatinine ratio 14 10/15/2013 12:35 PM     GFR est  10/15/2013 12:35 PM     GFR est non- 10/15/2013 12:35 PM     Calcium 10.0 10/15/2013 12:35 PM     Bilirubin, total 0.6 10/15/2013 12:35 PM     Alk.  phosphatase 86 10/15/2013 12:35 PM     Protein, total 7.6 10/15/2013 12:35 PM     Albumin 4.6 10/15/2013 12:35 PM     Globulin 3.4 08/14/2012 05:50 AM     A-G Ratio 1.5 10/15/2013 12:35 PM     ALT (SGPT) 17 10/15/2013 12:35 PM     AST (SGOT) 22 10/15/2013 12:35 PM            Lab Results   Component Value Date/Time     Valproic acid 64 08/17/2012 06:00 AM            Lab Results   Component Value Date/Time     Lithium level 0.50 (L) 08/17/2012 06:00 AM      RADIOLOGY REPORTS:(reviewed/updated 8/10/2021)  No results found. Lab Results   Component Value Date/Time     HCG urine, Ql. (POC) Negative 08/30/2013 12:00 PM            PAST PSYCHIATRIC HISTORY:  The patient reports that she was first hospitalized at the age of 14 years and estimates that she has had 5 or 10 prior psychiatric hospitalizations. Her last one was in a hospital in [facility] in 06/2021. She states that she was diagnosed with bipolar disorder about 8 years ago and has been intermittently on treatment since then. As noted above, she started using cocaine at the age of 15 years and has been in rehab without much success.     PSYCHOSOCIAL HISTORY:  The patient currently lives by herself in [place]. She is single, has never been  but does have 2 children including a 3year-old and an 6year-old, both of whom are in custody of family members. She is currently on social security disability for her bipolar disorder diagnosis and denies any major legal stressors.     MENTAL STATUS EXAM:  The patient is a young [place] American female who is dressed in casual street clothes. She looks depressed and cried during the interview. Makes limited eye contact. Speech is spontaneous and coherent. Mood is reported as being down. Affect is depressed. Passive thoughts of suicide are present but feels safe in the hospital.  Denies any perceptual abnormalities. Denies any delusions. Her thought process is logical and goal-directed. Cognitively, she is awake and alert, oriented to time, place and person. Intelligence is average. Memory is intact and fund of knowledge is adequate. Insight is partial.  Judgment is poor.     ASSESSMENT AND PLAN/DIAGNOSES:  Bipolar I disorder, most recent episode depression without psychotic symptoms, cocaine use disorder, severe, heroin use disorder, severe.     At the present time, I will continue her inpatient stay.   She will be provided with support and attend groups. Estimated length of stay is 5-7 days. Her strengths include her ability to seek help and support from her family.  [address]:    Patient was admitted to the inpatient psychiatry unit for acute psychiatric stabilization in regards to symptomatology as described in the HPI above and placed on Q15 minute checks and withdrawal precautions. While on the unit Sarah Garcia was involved in individual, group, occupational and milieu therapy. She was started back on her usual medication regimen as well as PRN medications including Remeron, Sertraline and Lamictal. The adverse events of this medication were discussed with the patient Sarah Garcia in detail including severe rash. Sarah Garcia understands the benefits as well as the risks involved in taking it and gave verbal informed consent to start the medication. The patient was also provided with a print out with information on the adverse events profile. She improved gradually and was able to integrate into the milieu with help from the nursing staff. Patients symptoms improved gradually including low moods, poor sleep, labile moods, SI. She was quite on the unit, appropriate in her interactions, and cooperative with medications and the unit routine. Please see individual progress notes for more specific details regarding patient's hospitalization course. Patient was discharged as per the plan. She had been doing well on the unit as per the report of the nursing staff and my observations. No PRN medication for agitation, seclusion or restraints were required during the last 48 hours of her stay. Sarah Garcia had improved progressively to the point of being stable for discharge and outpatient FU. At this time she did not offer any complaints. Patient denied any SI or HI. Denied any AH or VH. She denied any delusions. Was not considered a danger to self or to others and is safe for discharge.  Will FU with her appointments and remains motivated to be in treatment. The patient verbalized understanding of her discharge instructions. DISCHARGE DIAGNOSIS:  Bipolar I disorder, most recent episode depression without psychotic symptoms, cocaine use disorder, severe, heroin use disorder, severe. MENTAL STATUS EXAM ON DISCHARGE:    General appearance:   Hilda Horta is a 37 y.o. BLACK/ female who is well groomed, psychomotor activity is WNL  Eye contact: makes good eye contact  Speech: Spontaneous and coherent  Affect : Euthymic  Mood: \"OK\"  Thought Process: Logical, goal directed  Perception: Denies any AH or VH. Thought Content: Denies any SI or Plan  Insight: Partial  Judgement: Fair  Cognition: Intact grossly. Current Discharge Medication List      START taking these medications    Details   hydrOXYzine HCL (ATARAX) 50 mg tablet Take 1 Tablet by mouth two (2) times daily as needed for Anxiety for up to 30 days. Indications: anxious  Qty: 60 Tablet, Refills: 0  Start date: 8/10/2021, End date: 9/9/2021      lamoTRIgine (LaMICtal) 25 mg tablet Take 1 tab daily for 12 days, then 2 tabs daily for 7 days, then 3 tabs daily for 11 days. Indications: Mood  Qty: 59 Tablet, Refills: 0  Start date: 8/10/2021         CONTINUE these medications which have CHANGED    Details   mirtazapine (Remeron) 15 mg tablet Take 1 Tablet by mouth nightly. Indications: major depressive disorder  Qty: 30 Tablet, Refills: 0  Start date: 8/10/2021      sertraline (Zoloft) 50 mg tablet Take 1 Tablet by mouth daily.  Indications: bipolar depression  Qty: 30 Tablet, Refills: 0  Start date: 8/10/2021              Lab Results   Component Value Date/Time    Valproic acid 64 08/17/2012 06:00 AM     Lab Results   Component Value Date/Time    Lithium level 0.50 (L) 08/17/2012 06:00 AM       Follow-up Information     Follow up With Specialties Details Why [address] [facility]   Call on 8/11/2021 please call to set up a same day access appointment  [address] [facility], [facility]     [facility]  [phone]          WOUND CARE: none needed. PROGNOSIS:   Good / Fair based on nature of patient's pathology/ies and treatment compliance issues. Prognosis is greatly dependent upon patient's ability to  follow up on psychiatric/psychotherapy appointments as well as to comply with psychiatric medications as prescribed.

## 2021-08-10 NOTE — BH NOTES
Behavioral Health Transition Record to Provider    Patient Name: Carlin Lei  YOB: 1985  Medical Record Number: [id number]  Date of Admission: 8/8/2021  Date of Discharge: 8/10/2021    Attending Provider: No att. providers found  Discharging Provider: Shivani Dalton MD  To contact this individual call [phone] and ask the  to page. If unavailable, ask to be transferred to [facility] Provider on call. [facility] Provider will be available on call 24/7 and during holidays. Primary Care Provider: Isaiah Eagle MD    No Known Allergies    Reason for Admission: CHIEF COMPLAINT:  \"I don't feel so good today. \"     HISTORY OF PRESENT ILLNESS:  The patient is a 51-year-old [facility] American female who is currently admitted to us after she was transferred from a hospital in [place].  She reports that she drove to the hospital because she was having thoughts about suicide and feels that her whole life is falling apart.  Reports that her depression has been progressively worsening for several months and she has had multiple stressors recently.  This includes losing custody of her 6year-old daughter and also states that she is unable to keep a job.  States that she had been in rehab at [facility] in [facility] for about 5 months, but relapsed as soon as she left in 03/2021 and has been using since then. Maurice Sullivan estimates that she uses about 200 dollars worth of cocaine almost everyday and has been using since the age of 15 years. Henrik Parray that she also started using about 40 dollars worth of heroin which she uses IV or snorts and she has been using this for several months now. Tatyana Mckeon reports that she occasionally smokes marijuana.  Notes that she had been sleeping poorly, has had little energy or motivation and was having thoughts of suicide and wanted to end her life by taking an overdose of heroin.  Denies any perceptual abnormalities.  Reports that she has been partially compliant with taking her medications, although when she was taking them, she reports feeling better. Kevin Fan has been calm and cooperative since her arrival on the unit.  Cried briefly during the interview but was able to spontaneously recompose herself. Admission Diagnosis: Bipolar disorder ([facility] [address] [F31.9]    * No surgery found *    Results for orders placed or performed in visit on 10/15/13   CBC WITH AUTOMATED DIFF   Result Value Ref Range    WBC 8.1 3.4 - 10.8 x10E3/uL    RBC 5.25 3.77 - 5.28 x10E6/uL    HGB 14.1 11.1 - 15.9 g/dL    HCT 45.1 34.0 - 46.6 %    MCV 86 79 - 97 fL    MCH 26.9 26.6 - 33.0 pg    MCHC 31.3 (L) 31.5 - 35.7 g/dL    RDW 15.9 (H) 12.3 - 15.4 %    PLATELET 081 (H) 762 - 379 x10E3/uL    NEUTROPHILS 62 40 - 74 %    Lymphocytes 26 14 - 46 %    MONOCYTES 10 4 - 12 %    EOSINOPHILS 2 0 - 5 %    BASOPHILS 0 0 - 3 %    ABS. NEUTROPHILS 5.0 1.4 - 7.0 x10E3/uL    Abs Lymphocytes 2.1 0.7 - 3.1 x10E3/uL    ABS. MONOCYTES 0.8 0.1 - 0.9 x10E3/uL    ABS. EOSINOPHILS 0.1 0.0 - 0.4 x10E3/uL    ABS. BASOPHILS 0.0 0.0 - 0.2 x10E3/uL    IMMATURE GRANULOCYTES 0 0 - 2 %    ABS. IMM. GRANS. 0.0 0.0 - 0.1 B53L2/XE   METABOLIC PANEL, COMPREHENSIVE   Result Value Ref Range    Glucose 77 65 - 99 mg/dL    BUN 11 6 - 20 mg/dL    Creatinine 0.76 0.57 - 1.00 mg/dL    GFR est non- >59 mL/min/1.73    GFR est  >59 mL/min/1.73    BUN/Creatinine ratio 14 8 - 20    Sodium 141 134 - 144 mmol/L    Potassium 4.2 3.5 - 5.2 mmol/L    Chloride 105 97 - 108 mmol/L    CO2 16 (L) 19 - 28 mmol/L    Calcium 10.0 8.7 - 10.2 mg/dL    Protein, total 7.6 6.0 - 8.5 g/dL    Albumin 4.6 3.5 - 5.5 g/dL    GLOBULIN, TOTAL 3.0 1.5 - 4.5 g/dL    A-G Ratio 1.5 1.1 - 2.5    Bilirubin, total 0.6 0.0 - 1.2 mg/dL    Alk. phosphatase 86 42 - 107 IU/L    AST (SGOT) 22 0 - 40 IU/L    ALT (SGPT) 17 0 - 32 IU/L       Immunizations administered during this encounter:  There is no immunization history for the selected administration types on file for this patient. Screening for Metabolic Disorders for Patients on Antipsychotic Medications  (Data obtained from the EMR)    Estimated Body Mass Index  Estimated body mass index is 24.31 kg/m² as calculated from the following:    Height as of this encounter: 5' 5\" (1.651 m). Weight as of this encounter: 66.3 kg (146 lb 1.6 oz). Vital Signs/Blood Pressure  Visit Vitals  /78 (BP 1 Location: Left arm, BP Patient Position: Sitting)   Pulse 71   Temp 99 °F (37.2 °C)   Resp 16   Ht 5' 5\" (1.651 m)   Wt 66.3 kg (146 lb 1.6 oz)   SpO2 98%   Breastfeeding No   BMI 24.31 kg/m²       Blood Glucose/Hemoglobin A1c  Lab Results   Component Value Date/Time    Glucose 77 10/15/2013 12:35 PM    Glucose POC 67mg/dl 11/23/2011 10:05 AM       Lab Results   Component Value Date/Time    Hemoglobin A1c 5.4 07/21/2010 09:33 AM        Lipid Panel  Lab Results   Component Value Date/Time    Cholesterol, total 147 08/14/2012 05:50 AM    HDL Cholesterol 62 08/14/2012 05:50 AM    LDL, calculated 71.4 08/14/2012 05:50 AM    Triglyceride 68 08/14/2012 05:50 AM    CHOL/HDL Ratio 2.4 08/14/2012 05:50 AM        Discharge Diagnosis: Bipolar I disorder, most recent episode depression without psychotic symptoms, cocaine use disorder, severe, heroin use disorder, severe. Discharge Plan: Patient discharged home via lyft with follow up through [address]. The patient Shelba Ann exhibits the ability to control behavior in a less restrictive environment. Patient's level of functioning is improving. No assaultive/destructive behavior has been observed for the past 24 hours. No suicidal/homicidal threat or behavior has been observed for the past 24 hours. There is no evidence of serious medication side effects. Patient has not been in physical or protective restraints for at least the past 24 hours. If weapons involved, how are they secured?  No weapons involved     Is patient aware of and in agreement with discharge plan? She is aware of discharge and is in agreement     Arrangements for medication:  Prescriptions filled at [address] of discharge instructions to provider?:  Yes, fax to [address] for transportation home: Dawson Pulido 121 all follow up appointments as scheduled, continue to take prescribed medications per physician instructions. Mental health crisis number:  327 or your local mental health crisis line number at [phone]     Discharge Medication List and Instructions:   Discharge Medication List as of 8/10/2021  1:24 PM      START taking these medications    Details   hydrOXYzine HCL (ATARAX) 50 mg tablet Take 1 Tablet by mouth two (2) times daily as needed for Anxiety for up to 30 days. Indications: anxious, Normal, Disp-60 Tablet, R-0      lamoTRIgine (LaMICtal) 25 mg tablet Take 1 tab daily for 12 days, then 2 tabs daily for 7 days, then 3 tabs daily for 11 days. Indications: Mood, Normal, Disp-59 Tablet, R-0         CONTINUE these medications which have CHANGED    Details   mirtazapine (Remeron) 15 mg tablet Take 1 Tablet by mouth nightly. Indications: major depressive disorder, Normal, Disp-30 Tablet, R-0      sertraline (Zoloft) 50 mg tablet Take 1 Tablet by mouth daily. Indications: bipolar depression, Normal, Disp-30 Tablet, R-0             Unresulted Labs (24h ago, onward)    None        To obtain results of studies pending at discharge, please contact [phone]    Follow-up Information     Follow up With Specialties Details Why [address] [facility]   Call on 8/11/2021 please call to set up a same day access appointment  [address] [facility], [facility], [address] [place] [address]  [phone]            Advanced Directive:   Does the patient have an appointed surrogate decision maker? No  Does the patient have a Medical Advance Directive?  No  Does the patient have a Psychiatric Advance Directive? No  If the patient does not have a surrogate or Medical Advance Directive AND Psychiatric Advance Directive, the patient was offered information on these advance directives Patient declined to complete    Patient Instructions: Please continue all medications until otherwise directed by physician. Tobacco Cessation Discharge Plan:   Is the patient a smoker and needs referral for smoking cessation? Yes  Patient referred to the following for smoking cessation with an appointment? Refused     Patient was offered medication to assist with smoking cessation at discharge? Refused  Was education for smoking cessation added to the discharge instructions? Yes    Alcohol/Substance Abuse Discharge Plan:   Does the patient have a history of substance/alcohol abuse and requires a referral for treatment? Yes  Patient referred to the following for substance/alcohol abuse treatment with an appointment? Refused  Patient was offered medication to assist with alcohol cessation at discharge? Refused  Was education for substance/alcohol abuse added to discharge instructions? Yes    Patient discharged to Home; discussed with patient/caregiver and provided to the patient/caregiver either in hard copy or electronically.

## 2021-08-10 NOTE — PROGRESS NOTES
Problem: Chemical Dependency (Adult/Pediatric)  Goal: *STG: Participates in treatment plan  Outcome: Resolved/Met   During tx team discussed d/c plans to home and follow up care options with local [facility]. Review medications and pharmacy being used. Pt verbalized understanding.  Staff focus is on offering support and reassurance

## 2021-08-10 NOTE — SUICIDE SAFETY PLAN
SAFETY PLAN    A suicide Safety Plan is a document that supports someone when they are having thoughts of suicide. Warning Signs that indicate a suicidal crisis may be developing: What (situations, thoughts, feelings, body sensations, behaviors, etc.) do you experience that lets you know you are beginning to think about suicide? 1. Running from problems/masking  2. Feeling a sense of urgency vs hopelessness. 3. Not being able to be hopeful    Internal Coping Strategies:  What things can I do (relaxation techniques, hobbies, physical activities, etc.) to take my mind off my problems without contacting another person? 1. walk  2. write    People and social settings that provide distraction: Who can I call or where can I go to distract me? 1. Name: [employer]  Phone:  2. Name: Alfonzo Gonzales  Phone:     People whom I can ask for help: Who can I call when I need help - for example, friends, family, clergy, someone else? 1. Name: [employer]               Phone:   2. Name: Sabiha Correa        Phone:     Professionals or [facility] agencies I can contact during a crisis: Who can I call for help - for example, my doctor, my psychiatrist, my psychologist, a mental health provider, a suicide hotline? 1. Suicide Prevention Lifeline: 3-846-031-TALK (9887)    2. [address] [place] [address] Emergency Services -  for example, [facility] suicide hotline, [facility] Hotline:       Emergency Services Address: [address] [place] [address]      Emergency Services Phone: [phone]    Making the environment safe: How can I make my environment (house/apartment/living space) safer? For example, can I remove guns, medications, and other items? 1. Stay in my house and if I see someone, don't talk to him. 2. Remove drugs from home.

## 2021-08-10 NOTE — DISCHARGE INSTRUCTIONS
DISCHARGE SUMMARY    Rina Jay  : 1985  MRN: [id number]    The patient Glynn Ruiz exhibits the ability to control behavior in a less restrictive environment. Patient's level of functioning is improving. No assaultive/destructive behavior has been observed for the past 24 hours. No suicidal/homicidal threat or behavior has been observed for the past 24 hours. There is no evidence of serious medication side effects. Patient has not been in physical or protective restraints for at least the past 24 hours. If weapons involved, how are they secured? No weapons involved     Is patient aware of and in agreement with discharge plan? She is aware of discharge and is in agreement     Arrangements for medication:  Prescriptions filled at [address] of discharge instructions to provider?:  Yes, fax to [address] for transportation home: [facility] 121 all follow up appointments as scheduled, continue to take prescribed medications per physician instructions. Mental health crisis number:  560 or your local mental health crisis line number at [phone]     CLients in the [place] areas can be seen at the [facility] @ [address] [place] [address]. *Same Day Assessment:  Hours are 8:30 a.m. until 3:00 p.m. Monday through Friday  *Assessment by Appointment:  Initial assessments can also be done by appointment. Please call the Admissions Line above for more information.         Mental Health Emergency WARM LINE      [phone] ([phone])      M-F: 9am to 9pm      Sat & Sun: 5pm - 9pm  National suicide prevention lines:                             2-218-ZYVZTIL ([phone])       9-291-070-TALK ([phone])    Crisis Text Line:  Text HOME to [facility]vers 9 from Nurse    PATIENT INSTRUCTIONS:      What to do at Home:  Recommended activity: Activity as tolerated,         *  Please give a list of your current medications to your Primary Care Provider. *  Please update this list whenever your medications are discontinued, doses are      changed, or new medications (including over-the-counter products) are added. *  Please carry medication information at all times in case of emergency situations. These are general instructions for a healthy lifestyle:    No smoking/ No tobacco products/ Avoid exposure to second hand smoke  Surgeon General's Warning:  Quitting smoking now greatly reduces serious risk to your health. Obesity, smoking, and sedentary lifestyle greatly increases your risk for illness    A healthy diet, regular physical exercise & weight monitoring are important for maintaining a healthy lifestyle    You may be retaining fluid if you have a history of heart failure or if you experience any of the following symptoms:  Weight gain of 3 pounds or more overnight or 5 pounds in a week, increased swelling in our hands or feet or shortness of breath while lying flat in bed. Please call your doctor as soon as you notice any of these symptoms; do not wait until your next office visit. The discharge information has been reviewed with the patient. The patient verbalized understanding. Discharge medications reviewed with the patient and appropriate educational materials and side effects teaching were provided.   ___________________________________________________________________________________________________________________________________